# Patient Record
Sex: MALE | Race: WHITE | NOT HISPANIC OR LATINO | Employment: FULL TIME | URBAN - METROPOLITAN AREA
[De-identification: names, ages, dates, MRNs, and addresses within clinical notes are randomized per-mention and may not be internally consistent; named-entity substitution may affect disease eponyms.]

---

## 2024-03-13 ENCOUNTER — APPOINTMENT (OUTPATIENT)
Dept: RADIOLOGY | Facility: HOSPITAL | Age: 54
End: 2024-03-13
Payer: COMMERCIAL

## 2024-03-13 ENCOUNTER — HOSPITAL ENCOUNTER (EMERGENCY)
Facility: HOSPITAL | Age: 54
Discharge: HOME | End: 2024-03-13
Attending: EMERGENCY MEDICINE
Payer: COMMERCIAL

## 2024-03-13 VITALS
SYSTOLIC BLOOD PRESSURE: 127 MMHG | OXYGEN SATURATION: 97 % | HEART RATE: 80 BPM | DIASTOLIC BLOOD PRESSURE: 94 MMHG | WEIGHT: 190 LBS | HEIGHT: 71 IN | RESPIRATION RATE: 16 BRPM | BODY MASS INDEX: 26.6 KG/M2 | TEMPERATURE: 98.2 F

## 2024-03-13 DIAGNOSIS — S33.5XXA SPRAIN OF LOW BACK, INITIAL ENCOUNTER: Primary | ICD-10-CM

## 2024-03-13 PROCEDURE — 99284 EMERGENCY DEPT VISIT MOD MDM: CPT | Mod: 25

## 2024-03-13 PROCEDURE — 74176 CT ABD & PELVIS W/O CONTRAST: CPT | Performed by: RADIOLOGY

## 2024-03-13 PROCEDURE — 71250 CT THORAX DX C-: CPT

## 2024-03-13 PROCEDURE — 71250 CT THORAX DX C-: CPT | Performed by: RADIOLOGY

## 2024-03-13 ASSESSMENT — COLUMBIA-SUICIDE SEVERITY RATING SCALE - C-SSRS
6. HAVE YOU EVER DONE ANYTHING, STARTED TO DO ANYTHING, OR PREPARED TO DO ANYTHING TO END YOUR LIFE?: NO
2. HAVE YOU ACTUALLY HAD ANY THOUGHTS OF KILLING YOURSELF?: NO
1. IN THE PAST MONTH, HAVE YOU WISHED YOU WERE DEAD OR WISHED YOU COULD GO TO SLEEP AND NOT WAKE UP?: NO

## 2024-03-13 NOTE — Clinical Note
Jorge New was seen and treated in our emergency department on 3/13/2024.  He may return to work on 03/17/2024.       If you have any questions or concerns, please don't hesitate to call.      Salo Davies MD

## 2024-03-13 NOTE — DISCHARGE INSTRUCTIONS
Please use Tylenol and or Motrin as needed for muscle sprain pain for the next 2 to 3 days.  Please apply ice for 15 minutes/h for 4 hours/day to areas that are sore.  Please return to the ED for worsening pain, difficulty moving arms legs, change in behavior or any other concerning symptoms.

## 2024-03-13 NOTE — ED NOTES
CHW talked to patient regarding not having a primary care physician. CHW asked the patient if he would be interested in looking at a list of  physicians to choose from for future services. Patient agreed and was given a list of  physicians, they are accepting new patients, they are taking his insurance([facility]), and they are in the area where he lives. Patient expressed appreciation.     [facility]ail [facility]  03/13/24 1145

## 2024-03-14 NOTE — ED PROVIDER NOTES
HPI   Chief Complaint   Patient presents with    Fall    Back Pain     Pt reports having a fall today that resulted in bilateral low back pain. Pt states he fell about 3 ft. Pt denies other injuries. Pt A&Ox4. No obvious injuries.        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  Did land on the left side of chest as well as left side lower back.  Is having worsening pain and does present for further evaluation.  denies any chest or abdominal pain. + flank pain Denies any infectious symptoms.  Denies any urinary or fecal incontinence.  No focal motor or sensory deficits.                        Arash Coma Scale Score: 15                     Patient History   No past medical history on file.  No past surgical history on file.  No family history on file.  Social History     Tobacco Use    Smoking status: Not on file    Smokeless tobacco: Not on file   Substance Use Topics    Alcohol use: Not on file    Drug use: Not on file       Physical Exam   ED Triage Vitals [03/13/24 1109]   Temperature Heart Rate Respirations BP   36.8 °C (98.2 °F) (!) 102 18 (!) 142/91      Pulse Ox Temp Source Heart Rate Source Patient Position   96 % Oral Monitor --      BP Location FiO2 (%)     -- --       Physical Exam  Vitals and nursing note reviewed.   Constitutional:       Appearance: Normal appearance. He is normal weight.   HENT:      Head: Normocephalic and atraumatic.      Mouth/Throat:      Mouth: Mucous membranes are moist.      Pharynx: Oropharynx is clear.   Eyes:      Extraocular Movements: Extraocular movements intact.      Conjunctiva/sclera: Conjunctivae normal.      Pupils: Pupils are equal, round, and reactive to light.   Cardiovascular:      Rate and Rhythm: Normal rate and regular rhythm.      Pulses: Normal pulses.      Heart sounds: Normal heart sounds.   Pulmonary:      Effort:  Pulmonary effort is normal.      Breath sounds: Normal breath sounds.   Abdominal:      General: Abdomen is flat. Bowel sounds are normal. There is no distension.      Tenderness: There is abdominal tenderness. There is left CVA tenderness and rebound. There is no right CVA tenderness or guarding.   Musculoskeletal:         General: No tenderness or deformity. Normal range of motion.      Cervical back: Normal range of motion and neck supple.      Comments: Positive tenderness of patient over left flank down to left lower back   Skin:     General: Skin is warm and dry.      Capillary Refill: Capillary refill takes less than 2 seconds.   Neurological:      General: No focal deficit present.      Mental Status: He is alert and oriented to person, place, and time. Mental status is at baseline.   Psychiatric:         Mood and Affect: Mood normal.         Behavior: Behavior normal.         Thought Content: Thought content normal.         Judgment: Judgment normal.         ED Course & MDM   Diagnoses as of 03/14/24 1539   Sprain of low back, initial encounter       Medical Decision Making  CT chest abdomen pelvis for further evaluation given mechanism and gear.  This was negative for acute pathology.  His neuroexam does not show any focal neurofindings.  No signs for cord compression.  Descriptions are equal bilaterally.  Gait is within normal limits.  Does have pain noted to the left flank as well lower back which appears musculoskeletal in nature.  Given negative workup here in the ED patient was able discharged home with plan for symptomatic care instructions and follow-up primary care physician for ongoing work claim.  Return precautions are discussed.    Amount and/or Complexity of Data Reviewed  Radiology: ordered. Decision-making details documented in ED Course.        Procedure  Procedures     Salo Davies MD  03/14/24 6602

## 2024-05-20 ENCOUNTER — PATIENT MESSAGE (OUTPATIENT)
Dept: ORTHOPEDIC SURGERY | Facility: CLINIC | Age: 54
End: 2024-05-20

## 2024-05-20 ENCOUNTER — OFFICE VISIT (OUTPATIENT)
Dept: ORTHOPEDIC SURGERY | Facility: CLINIC | Age: 54
End: 2024-05-20
Payer: COMMERCIAL

## 2024-05-20 VITALS — BODY MASS INDEX: 27.2 KG/M2 | WEIGHT: 190 LBS | HEIGHT: 70 IN

## 2024-05-20 DIAGNOSIS — M54.16 LUMBAR RADICULOPATHY: ICD-10-CM

## 2024-05-20 DIAGNOSIS — R29.818 NEUROGENIC CLAUDICATION: Primary | ICD-10-CM

## 2024-05-20 DIAGNOSIS — S39.012A STRAIN OF LUMBAR PARASPINAL MUSCLE, INITIAL ENCOUNTER: ICD-10-CM

## 2024-05-20 PROCEDURE — 99203 OFFICE O/P NEW LOW 30 MIN: CPT

## 2024-05-20 ASSESSMENT — PAIN - FUNCTIONAL ASSESSMENT: PAIN_FUNCTIONAL_ASSESSMENT: 0-10

## 2024-05-20 NOTE — PROGRESS NOTES
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  He landed on his back with equipment on.  He states he had bilateral low back pain which is progressed to just left-sided low back pain.  He has been going to  in [place] and his symptoms have been improving.  His pain is dull, but can become sharp when getting in and out of a car, out of a chair.  It is also worse with prolonged standing and walking, which he feels he has to stop, standing, and bending forward.  He denies radicular pain in the legs, denies numbness and tingling.    ROS:  Reviewed on EMR and patient intake sheet.    PMH/SH:  Reviewed on EMR and patient intake sheet.    Exam:  MSK: Full strength range of motion of lower extremities bilaterally.  Negative straight leg raise bilaterally.  General: No acute distress. Awake and conversant.  Eyes: Normal conjunctiva, anicteric. Round symmetric pupils.  ENT: Hearing grossly intact. No nasal discharge.  Neck: Neck is supple. No masses or thyromegaly.  Respiratory: Respirations are non-labored. No wheezing.  Skin: Warm. No rashes or ulcers.  Psych: Alert and oriented. Cooperative, appropriate mood and affect, normal judgement.  CV: No lower extremity edema.  Neuro: Sensation and CN II-XII grossly normal.    Radiology:     CT scan personally reviewed and demonstrates normal alignment of lumbar spine.  No acute fracture dislocations.    Diagnosis:    Lumbar muscle strain  Neurogenic claudication    Assessment and Plan:  Patient was seen today and evaluated for low back pain and intermittent claudication symptoms.  At this point, I recommended continuing physical therapy exercises at home, ambulating as tolerated, an MRI of his lumbar spine.  He may follow-up after completion of his MRI in order to go over the imaging studies.  At that point, we will discuss going back to full duty at  work.  Educated the patient on the longevity of traumatic muscle strains and how his symptoms may continue to wax and wane over a few more weeks.  Patient feels all questions were appropriately answered at time of visit today.  Patient agrees to the plan above.    This note was dictated using speech recognition software and was not corrected for spelling or grammatical errors    Yoav Oconnell PA-C  Department of Orthopaedic Surgery  9:10 AM  05/20/24      [id number] [place] [address]    Voicemail: [phone]   Appts: [phone]  Fax: [phone]

## 2024-05-23 ENCOUNTER — APPOINTMENT (OUTPATIENT)
Dept: ORTHOPEDIC SURGERY | Facility: CLINIC | Age: 54
End: 2024-05-23
Payer: COMMERCIAL

## 2024-06-03 ENCOUNTER — APPOINTMENT (OUTPATIENT)
Dept: RADIOLOGY | Facility: HOSPITAL | Age: 54
End: 2024-06-03

## 2024-06-10 ENCOUNTER — OFFICE VISIT (OUTPATIENT)
Dept: ORTHOPEDIC SURGERY | Facility: CLINIC | Age: 54
End: 2024-06-10
Payer: COMMERCIAL

## 2024-06-10 DIAGNOSIS — S39.012S LUMBAR SPINE STRAIN, SEQUELA: Primary | ICD-10-CM

## 2024-06-10 PROCEDURE — 99213 OFFICE O/P EST LOW 20 MIN: CPT

## 2024-06-10 NOTE — PROGRESS NOTES
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  He states his back pain has improved greatly and is now a 1 or 2 out of 10 at worst.  He has been going to PT in [place] and his symptoms been improving greatly.  It is worse with specific movements such as getting out of a car or out of a chair.  Sometimes it is worse with prolonged standing and walking.  He denies radicular pain.  He denies numbness and tingling in the lower extremities.    ROS:  Reviewed on EMR and patient intake sheet.    PMH/SH:  Reviewed on EMR and patient intake sheet.    Exam:  MSK: Full strength range of motion of lower extremities bilaterally.  Negative straight leg raise bilaterally.  No tenderness palpation of lumbar spine midline or paraspinal muscles.  General: No acute distress. Awake and conversant.  Eyes: Normal conjunctiva, anicteric. Round symmetric pupils.  ENT: Hearing grossly intact. No nasal discharge.  Neck: Neck is supple. No masses or thyromegaly.  Respiratory: Respirations are non-labored. No wheezing.  Skin: Warm. No rashes or ulcers.  Psych: Alert and oriented. Cooperative, appropriate mood and affect, normal judgement.  CV: No lower extremity edema.  Neuro: Sensation and CN II-XII grossly normal.    Radiology:     MRI personally reviewed and demonstrates patent spinal canal in the lumbar region.  No foraminal stenosis noted.  Normal MRI.    Diagnosis:    Lumbar muscle strain    Assessment and Plan:  Patient was seen today and evaluated for low back pain that has been improving over the past few months after falling backwards while training at work.  At this point, his CT images and MRI images demonstrate no acute fractures, dislocations, or nerve impingement.  He may return to work on full duty and he feels he is ready.  I educated the patient that he may be experiencing on and off back pain while he increases  workload.  Patient feels her questions were properly answered at time of today.  Patient agrees to plan above.    This note was dictated using speech recognition software and was not corrected for spelling or grammatical errors    Yoav Oconnell PA-C  Department of Orthopaedic Surgery  1:32 PM  06/10/24      [address] [place] [address]    Voicemail: [phone]   Appts: [phone]  Fax: [phone]    Jorge

## 2024-12-04 ENCOUNTER — TELEPHONE (OUTPATIENT)
Dept: EMERGENCY MEDICINE | Facility: HOSPITAL | Age: 54
End: 2024-12-04
Payer: COMMERCIAL

## 2024-12-04 DIAGNOSIS — Z00.00 WELLNESS EXAMINATION: Primary | ICD-10-CM

## 2025-03-18 ENCOUNTER — HOSPITAL ENCOUNTER (OUTPATIENT)
Dept: RADIOLOGY | Facility: CLINIC | Age: 55
Discharge: HOME | End: 2025-03-18
Payer: COMMERCIAL

## 2025-03-18 DIAGNOSIS — Z00.00 WELLNESS EXAMINATION: ICD-10-CM

## 2025-03-18 PROCEDURE — 75571 CT HRT W/O DYE W/CA TEST: CPT
